# Patient Record
(demographics unavailable — no encounter records)

---

## 2024-12-23 NOTE — PHYSICAL EXAM
[IV] : Mallampati Class: IV [Normal Appearance] : normal appearance [Supple] : supple [No Neck Mass] : no neck mass [No JVD] : no jvd [Normal Rate/Rhythm] : normal rate/rhythm [Normal S1, S2] : normal s1, s2 [No Murmurs] : no murmurs [No Resp Distress] : no resp distress [No Acc Muscle Use] : no acc muscle use [Kyphosis] : kyphosis [Benign] : benign [Not Tender] : not tender [No Hernias] : no hernias [Normal Gait] : normal gait [No Clubbing] : no clubbing [No Edema] : no edema [No Rash] : no rash [No Motor Deficits] : no motor deficits [Normal Affect] : normal affect [TextBox_2] : pleasant  f  bronchial dry cough noted  no sob [TextBox_11] : crowded airway  no lesion dentures  [TextBox_68] : no w   bronchial cough

## 2024-12-23 NOTE — ASSESSMENT
[FreeTextEntry1] : 77y/o female  1- COPD severe chronic bronchitis/ resumed smoking > 20 pack years / kyphosis with restriction / dog  2- 3/2023 ctpa resolved pneumonia and PE emphysema / LE duplex resolved DVT ( 12/2022+)  3- HFrEF (LVEF 50-55% 7/6/21): NYHA class II-III. 4- poor follow up  5- vaccinations per primary  refuses  flu    refuses  pneumonia     Recommendations 1- screening ct     reviewed   to do   2- tolerating Tr elegy 100 qd 3-  resume       azithromycin TIW 4- albuterol MDI rescue 5- PFT  6- eliquis 7-  refuses nicotine replacement discussed need and benefits to quitting 8- nebulizer ordered 9- albuterol Neb  q  6 hour prn     -   discussion about above  - discussion about keeping her follow up emmanuelle and  screening ct   importance for dx and prognosis  - encouraged to quit smoking

## 2024-12-23 NOTE — HISTORY OF PRESENT ILLNESS
[Current] : current [>= 20 pack years] : >= 20 pack years [Never] : never [TextBox_4] : 73y/o  [place]  ex smoker ( 17-  2ppd  quit     2013   resumed   2022)     COPD    MI HFrecEF (LVEF 50-55% 7/6/21): NYHA class II-III.    h/o  DVT/PE h/o   bile leak status post ERCP with stent placement  for    repeat   EGD and  ERCP    - trelegy  not taking -  using    albuterol MDI prn only  -thinks she does not need  any medications  nor oxygen since she feels great - she has a chronic   cough     bronchial clear phlegm  however dose not bother her - last ct below  not repeated --       -no chest pain no sob   no wheezing  3/9/2023 73y/o female  ex smoker  COPD    HFrEF  h/o PE/DVT     pre-operative clearance    EGD/ERCP - reviewed repeat ctpa  LE duplex negative   resolved clots - off  elquis on sq heparin - feeling better with  trelegy 100 qd  tolerated - sputum improved after z tasia  -  4/17/2023 73y/o  female   resumed  smoker  few a day   now  > 20 pack years   COPD  HFrEF   DVT/PE  eliquis resumed now    tolerated biliary stent removal  here follow up  - doing well   has   increased phlegm since  resumed smoking clear thick   no hemoptysi s -tolerating trelegy    no issues  feels better -trelegy  100 qd   8/14/2023 74y/o  female   smoker  now 1/2 pack day   COPD  HFrEF  DVT/ PE  on elquis    COPD dog - has oxygen at home -trelegy   doing well   - air quality poor was issue now resolved - now improved  azithromycin tiw   -not ready to quit smoking        11/30/2023 74y/o  female smoker now  10 cig  day   refuses to cut down > 100 pack years    COPD - doign well on trelegy  - azithromycin tiw  good results - no chest pain no sob - refuses flu  - no hemoptysis  -  -  12/23/2024 75y/o  female  current smoker not ready to quit  still   10 cig/day      COPD dog  still works  in school   - no hemoptysis  - ran out of meds   azithromycin  tiw - has trelegy   doing well   -needs new    nebulizer  - no hemoptysis no chest pain   - smokes out side the house - works  without smoking  - did not get screening ct due to   not able to get there that day -   [TextBox_11] : 2 [TextBox_13] : 50

## 2024-12-23 NOTE — REVIEW OF SYSTEMS
[Cough] : cough [Abdominal Pain] : abdominal pain [Chronic Pain] : chronic pain [Clotting Disorder/ Frequent bleeding] : clotting disorder/ frequent bleeding [Fever] : no fever [Recent Wt Gain (___ Lbs)] : ~T no recent weight gain [Chills] : no chills [Recent Wt Loss (___ Lbs)] : ~T no recent weight loss [Sore Throat] : no sore throat [Nasal Congestion] : no nasal congestion [Postnasal Drip] : no postnasal drip [Sinus Problems] : no sinus problems [Hemoptysis] : no hemoptysis [Sputum] : no sputum [Dyspnea] : no dyspnea [Wheezing] : no wheezing [SOB on Exertion] : no sob on exertion [Chest Discomfort] : no chest discomfort [Orthopnea] : no orthopnea [Palpitations] : no palpitations [GERD] : no gerd [Nausea] : no nausea [Vomiting] : no vomiting [Bleeding] : no bleeding [Myalgias] : no myalgias [Rash] : no rash [Blood Transfusion] : no blood transfusion [Diabetes] : no diabetes [Thyroid Problem] : no thyroid problem [Obesity] : no obesity [TextBox_30] : clear phlegm now

## 2024-12-23 NOTE — PROCEDURE
[FreeTextEntry1] : 3/9/2023   resting pulse ox   98%  improved perfusion today  hr   80's  walk down hallway  room air pulse ox   95%  hr 100 \par  \par  \par  PFT 3/6/23  FET  12.38  \par  FVC  2.35  101%   FEV1  0.86 47.34 %  r low  + small airway obstruction     TLC-He  normal  dlco  42.2  reduced   DLCO/Va  low    Hb 11.6 \par  \par  compared to prior study PFT 2021    FEV1 has improved   from  0.77 L   40 %  predicted to 0.86 L   47% \par  \par  3/6/23             poor  capture  pulse ox

## 2024-12-26 NOTE — HISTORY OF PRESENT ILLNESS
[Current] : Current [TextBox_13] : Patient is scheduled for a baseline LDCT for lung cancer screening. Chart review performed to confirm eligibility for LDCT.   No documented personal or family history of lung cancer. No documented s/s of lung cancer.   [PacksperDay] : 2 [N_Years] : 55 [PacksperYear] : 110

## 2024-12-26 NOTE — HISTORY OF PRESENT ILLNESS
[FreeTextEntry1] : 2/16/2023 74 year old female, active smoker, with a PMHx of MI in 2013 ([facility] 2013 with thrombosis of RCA with collaterals filling from the left system, and 80% LAD s/p CLAUDIA complicated by left pseudoaneurysm) remote DVT/PE, Afib (on coumadin), [place] Cardiomyopathy (2017), COPD, HTN, HLD, and Covid 19 infection(12/2020) who presents today for scheduled follow up.   She as hospitalized from October to December for cholecystitis/choledocholithiasis. Hospital course complicated by acute submassive DVT/PE treated with heparin, now on eliquis.   She now feels well and has no complaints. she is to go for repeat ERCP and possible restenting soon. Denies chest pains or shortness of breath. she walks around the house and up the stairs.   Otherwise, denies orthopnea, paroxysmal nocturnal dyspnea, lower extremity edema, unexplained weight gain or dyspnea on exertion.  ECG today with normal sinus rhythm and prior anterior/septal infarct pattern.   8/24/2023 Patient here for routine follow up. She states she is feeling good. Denies chest pain, SOB at rest, palpitations, near syncope or syncope. Has occasional GREEN with hx COPD/smoking, unchanged. Follows with pulmonary. Reports compliance with medications. Denies bleeding on AC. Smokes 1/2-1 PPD for 50 years.   2/2024 Presents today for fuv. Feels well and has no complaints.  Continues to smoke but intents to stop.  BP mildly elevated. ECG without ischemic changes.  Recent echo reviewed: normal LVEF. Basal inferoseptal and inferior segments akinetic at baseline.   12/2024 Presents for fuv. No chest pain. Continues to smoke. She has not taken anything for diabetes in months. Has no PCP. We started her on metformin which she started taking three days ago.  Otherwise, denies orthopnea, paroxysmal nocturnal dyspnea, lower extremity edema, unexplained weight gain or dyspnea on exertion. BP well controlled.

## 2024-12-26 NOTE — DISCUSSION/SUMMARY
[FreeTextEntry1] : Plan 1. Submassive PE: likely provoked while in hospital. On Eliquis.  Continue for now.  2. HFrecEF (LVEF 50-55% 2024): NYHA class II-III. Continue Entresto 49-51mg BID, Toprol-XL 25mg daily, Aldactone 25mg daily. Continue above 3. CAD- No CP or SOB. Continue daily ASA/Statin therapy. 4. PAF- Sinus today. Continue Eliquis 5mg BID for stroke risk reduction. 5. HTN- Controlled. Continue current meds. 6. hx COPD- smoking cessation was discussed. follow-up with pulmonology 7. DMII: Establish care with PCP. COntinue metformin [EKG obtained to assist in diagnosis and management of assessed problem(s)] : EKG obtained to assist in diagnosis and management of assessed problem(s)

## 2024-12-26 NOTE — PHYSICAL EXAM
[Well Developed] : well developed [Frail] : frail [Normal Conjunctiva] : normal conjunctiva [No Carotid Bruit] : no carotid bruit [Normal S1, S2] : normal S1, S2 [No Murmur] : no murmur [No Respiratory Distress] : no respiratory distress  [Wheeze ____] : wheeze [unfilled] [Soft] : abdomen soft [Non Tender] : non-tender [Normal Gait] : normal gait [No Edema] : no edema [No Cyanosis] : no cyanosis [No Clubbing] : no clubbing [No Rash] : no rash [Moves all extremities] : moves all extremities [No Focal Deficits] : no focal deficits [Normal Speech] : normal speech [Alert and Oriented] : alert and oriented [Normal memory] : normal memory

## 2024-12-26 NOTE — REVIEW OF SYSTEMS
[SOB] : no shortness of breath [Dyspnea on exertion] : dyspnea during exertion [Chest Discomfort] : no chest discomfort [Lower Ext Edema] : no extremity edema [Palpitations] : no palpitations [Orthopnea] : no orthopnea [PND] : no PND [Syncope] : no syncope [Cough] : cough [Wheezing] : no wheezing [Negative] : Heme/Lymph [FreeTextEntry6] : productive cough with white sputum, hx COPD

## 2024-12-26 NOTE — CARDIOLOGY SUMMARY
[de-identified] : 2/16/23: septal infarct pattern [de-identified] : 7/6/2021: LVEF 50-55%, LVIDd 4.25, grade I DD, normal RV size with mildly reduced systolic function, mild TR. 7/2023:  Abnormal septal motion. Left ventricular systolic function is low normal with an ejection fraction of 53 % by Villareal's method of disks with an ejection fraction visually estimated at 50 to 55 %. 2. Basal inferoseptal segment and basal inferior segment are abnormal. 3. Normal right ventricular cavity size and systolic function. 4. Mild calcification of the aortic valve leaflets. 5. Trace mitral regurgitation. 6. Mild tricuspid regurgitation. 7. Trace aortic regurgitation. 8. Trace pulmonic regurgitation. 9. Estimated pulmonary artery systolic pressure is 61 mmHg, consistent with severe pulmonary hypertension. 10. No pericardial effusion seen. 11. Compared to the transthoracic echocardiogram performed on 6/6/2021 increase in PASP. [de-identified] : Carotid Duplex 5/12/21: mild right ECA atherosclerosis and mild - moderate left ECA atherosclerosis. [___] : [unfilled] [LVEF ___%] : LVEF [unfilled]% [Normal] : normal LA size [Moderate] : moderate mitral regurgitation

## 2025-02-18 NOTE — PHYSICAL EXAM
AA&Ox4.   RA. Denies SOB.  Reporting 8/10 pain. Medicated per MAR.   Dressings to RLE CDI.   Tolerating regular diet. Denies N/V.  + void. + BM.   Pt up with assist x2 when getting OOB.   All needs met at this time. Call light within reach. Pt calls appropriately.   [No Acute Distress] : no acute distress [No Deformities] : no deformities [III] : Mallampati Class: III [Normal Appearance] : normal appearance [Supple] : supple [No Neck Mass] : no neck mass [No JVD] : no jvd [Normal Rate/Rhythm] : normal rate/rhythm [Normal S1, S2] : normal s1, s2 [No Resp Distress] : no resp distress [No Acc Muscle Use] : no acc muscle use [Kyphosis] : kyphosis [Benign] : benign [Not Tender] : not tender [No Hernias] : no hernias [Normal Gait] : normal gait [No Clubbing] : no clubbing [No Edema] : no edema [No Rash] : no rash [No Motor Deficits] : no motor deficits [Normal Affect] : normal affect [TextBox_2] : pleasant f no distress no cough no sob [TextBox_11] : nol esion moist crowded  [TextBox_68] : bronchial cough

## 2025-02-18 NOTE — PROCEDURE
[FreeTextEntry1] : 2/18/2025  resting room air  sats   97%  heart rate   64 bpm e exercise  walk   poor  capture  no  sob   3/9/2023   resting pulse ox   98%  improved perfusion today  hr   80's  walk down hallway  room air pulse ox   95%  hr 100    PFT 3/6/23  FET  12.38   FVC  2.35  101%   FEV1  0.86 47.34 %  r low  + small airway obstruction     TLC-He  normal  dlco  42.2  reduced   DLCO/Va  low    Hb 11.6   compared to prior study PFT 2021    FEV1 has improved   from  0.77 L   40 %  predicted to 0.86 L   47%   3/6/23             poor  capture  pulse ox

## 2025-02-18 NOTE — HISTORY OF PRESENT ILLNESS
[Current] : current [>= 20 pack years] : >= 20 pack years [Never] : never [TextBox_4] : 75y/o  [place]  ex smoker ( 17-  2ppd  quit     2013   resumed   2022)     COPD    MI HFrecEF (LVEF 50-55% 7/6/21): NYHA class II-III.    h/o  DVT/PE h/o   bile leak status post ERCP with stent placement  for    repeat   EGD and  ERCP    - trelegy  not taking -  using    albuterol MDI prn only  -thinks she does not need  any medications  nor oxygen since she feels great - she has a chronic   cough     bronchial clear phlegm  however dose not bother her - last ct below  not repeated --       -no chest pain no sob   no wheezing  3/9/2023 75y/o female  ex smoker  COPD    HFrEF  h/o PE/DVT     pre-operative clearance    EGD/ERCP - reviewed repeat ctpa  LE duplex negative   resolved clots - off  elquis on sq heparin - feeling better with  trelegy 100 qd  tolerated - sputum improved after z tasia  -  4/17/2023 75y/o  female   resumed  smoker  few a day   now  > 20 pack years   COPD  HFrEF   DVT/PE  eliquis resumed now    tolerated biliary stent removal  here follow up  - doing well   has   increased phlegm since  resumed smoking clear thick   no hemoptysi s -tolerating trelegy    no issues  feels better -trelegy  100 qd   8/14/2023 74y/o  female   smoker  now 1/2 pack day   COPD  HFrEF  DVT/ PE  on elquis    COPD dog - has oxygen at home -trelegy   doing well   - air quality poor was issue now resolved - now improved  azithromycin tiw   -not ready to quit smoking        11/30/2023 74y/o  female smoker now  10 cig  day   refuses to cut down > 100 pack years    COPD - doign well on trelegy  - azithromycin tiw  good results - no chest pain no sob - refuses flu  - no hemoptysis  -  -  12/23/2024 77y/o  female  current smoker not ready to quit  still   10 cig/day      COPD dog  still works  in school   - no hemoptysis  - ran out of meds   azithromycin  tiw - has trelegy   doing well   -needs new    nebulizer  - no hemoptysis no chest pain   - smokes out side the house - works  without smoking  - did not get screening ct due to   not able to get there that day -  2/18/2025 77y/o  female  current smoker not  ready to quit      11 cig/day  COPD  abn  ct    7 mm nodule    - discussed  ct  again   to  have follow up three months agrees - trelegy  200 qd   doing well  - no cough no sob  - azithromycin  tiw  doing well  -  [TextBox_11] : 2 [TextBox_13] : 50

## 2025-02-18 NOTE — ASSESSMENT
[FreeTextEntry1] : 75y/o female  1-   COPD severe chronic bronchitis/ resumed smoking > 20 pack years / kyphosis with restriction / dog  2- ct 1/2025  nodules   largest  RUL  7 mm  f/u three months             3/2023 ctpa resolved pneumonia and PE emphysema / LE duplex resolved DVT ( 12/2022+)  3- HFrEF (LVEF 50-55% 7/6/21): NYHA class II-III. 4- poor follow up  5- vaccinations per primary  refuses  flu    refuses  pneumonia     Recommendations 1- screening ct     short term    f/u discussed agrees  2- tolerating Tr elegy 100 qd 3-        azithromycin TIW 4- albuterol MDI rescue 5- PFT  6- eliquis 7-  refuses nicotine replacement discussed need and benefits to quitting 8- nebulizer ordered 9- albuterol Neb  q  6 hour prn     -  smoking cessation discussed including risk of cardiac    complications  - discussed ct - walk test - agrement on plan

## 2025-02-18 NOTE — REASON FOR VISIT
[Follow-Up] : a follow-up visit [Emphysema] : emphysema [Nicotine Dependence] : nicotine dependence [Abnormal CXR/ Chest CT] : an abnormal CXR/ chest CT